# Patient Record
Sex: MALE | Race: OTHER | HISPANIC OR LATINO | URBAN - METROPOLITAN AREA
[De-identification: names, ages, dates, MRNs, and addresses within clinical notes are randomized per-mention and may not be internally consistent; named-entity substitution may affect disease eponyms.]

---

## 2018-02-19 ENCOUNTER — EMERGENCY (EMERGENCY)
Facility: HOSPITAL | Age: 43
LOS: 1 days | Discharge: HOME | End: 2018-02-19
Attending: EMERGENCY MEDICINE
Payer: COMMERCIAL

## 2018-02-19 VITALS
RESPIRATION RATE: 18 BRPM | TEMPERATURE: 99 F | HEART RATE: 70 BPM | OXYGEN SATURATION: 99 % | SYSTOLIC BLOOD PRESSURE: 128 MMHG | HEIGHT: 70 IN | WEIGHT: 179.9 LBS | DIASTOLIC BLOOD PRESSURE: 76 MMHG

## 2018-02-19 PROCEDURE — 99282 EMERGENCY DEPT VISIT SF MDM: CPT

## 2018-02-19 NOTE — ED PROVIDER NOTE - PROGRESS NOTE DETAILS
History and findings suggestive of viral syndrome, likely flu. Well-appearing, will dc with supportive care and PMD follow up in few days. Pt is well appearing walking with steady gait, stable for discharge and follow up without fail with medical doctor. I had a detailed discussion with the patient and/or guardian regarding the historical points, exam findings, and any diagnostic results supporting the discharge diagnosis. Pt educated on care and need for follow up. Strict return instructions and red flag signs and symptoms discussed with patient. Questions answered. Pt shows understanding of discharge information and agrees to follow.

## 2018-02-19 NOTE — ED PROVIDER NOTE - OBJECTIVE STATEMENT
43 year-old male, history of asthma, presents with flu-like symptoms x 4 days. Gradual onset of generalized body aches/weakness, night sweats, subjective fever, cough. Associated with mild diarrhea. Mild alleviation with Dayquil/Nyquil. Sister and niece with similar symptoms and they are starting to feel better. Denies headache, visual changes, neck stiffness, SOB, chest pain, abdo pain or any other complaints.

## 2018-02-19 NOTE — ED PROVIDER NOTE - MEDICAL DECISION MAKING DETAILS
43 year-old male, presents with flu-like symptoms x 4 days. Well-appearing, vital signs within normal limits, afebrile. History and findings suggestive of viral syndrome. Plan: tara.

## 2018-12-23 NOTE — ED ADULT TRIAGE NOTE - BP NONINVASIVE SYSTOLIC (MM HG)
128 I have reviewed and confirmed nurses' notes for patient's medications, allergies, medical history, and surgical history.

## 2019-04-29 NOTE — ED ADULT TRIAGE NOTE - LOCATION:
Left arm; Quality 431: Preventive Care And Screening: Unhealthy Alcohol Use - Screening: Patient screened for unhealthy alcohol use using a single question and scores less than 2 times per year Quality 110: Preventive Care And Screening: Influenza Immunization: Influenza Immunization previously received during influenza season Quality 226: Preventive Care And Screening: Tobacco Use: Screening And Cessation Intervention: Patient screened for tobacco use and is an ex/non-smoker Quality 111:Pneumonia Vaccination Status For Older Adults: Pneumococcal Vaccination Previously Received Quality 131: Pain Assessment And Follow-Up: Pain assessment using a standardized tool is documented as negative, no follow-up plan required Detail Level: Detailed

## 2021-02-08 NOTE — ED ADULT NURSE NOTE - CAS DISCH TRANSFER METHOD
Private car [employer] Counseling: I discussed with the patient the risks of ixekizumab including but not limited to immunosuppression, serious infections, worsening of inflammatory bowel disease and drug reactions.  The patient understands that monitoring is required including a PPD at baseline and must alert us or the primary physician if symptoms of infection or other concerning signs are noted.

## 2024-11-26 NOTE — ED PROVIDER NOTE - CONSTITUTIONAL, MLM
How Severe Is Your Skin Lesion?: mild Is This A New Presentation, Or A Follow-Up?: Skin Lesions normal... Well appearing, well nourished, awake, alert, oriented to person, place, time/situation and in no apparent distress.